# Patient Record
Sex: MALE | URBAN - METROPOLITAN AREA
[De-identification: names, ages, dates, MRNs, and addresses within clinical notes are randomized per-mention and may not be internally consistent; named-entity substitution may affect disease eponyms.]

---

## 2017-02-02 ENCOUNTER — THERAPY VISIT (OUTPATIENT)
Dept: PHYSICAL THERAPY | Facility: CLINIC | Age: 22
End: 2017-02-02
Payer: COMMERCIAL

## 2017-02-02 DIAGNOSIS — M25.519 SHOULDER PAIN: Primary | ICD-10-CM

## 2017-02-02 PROCEDURE — 97112 NEUROMUSCULAR REEDUCATION: CPT | Mod: GP | Performed by: PHYSICAL THERAPIST

## 2017-02-02 PROCEDURE — 97161 PT EVAL LOW COMPLEX 20 MIN: CPT | Mod: GP | Performed by: PHYSICAL THERAPIST

## 2017-02-02 PROCEDURE — 97110 THERAPEUTIC EXERCISES: CPT | Mod: GP | Performed by: PHYSICAL THERAPIST

## 2017-02-02 NOTE — Clinical Note
[facility] [place] [id number] [facility] [id number]    February 3, 2017    Re: Nghia Madrid   :   1995  MRN:  [id number]   REFERRING PHYSICIAN:   Edward Medel    [facility]    Date of Initial Evaluation: 17  Visits:  Rxs Used: 1  Reason for Referral:  Shoulder pain    EVALUATION SUMMARY    [facility] Initial Evaluation  Physical Therapy Initial Examination/Evaluation  2017    Nghia Madrid is a 21 year old  male referred to physical therapy by Dr. Medel for treatment of right shoulder pain with Precautions/Restrictions/MD instructions eval and treat      Subjective:  Referring MD visit date: 17  DOI/onset: 17  Mechanism of injury: Overuse injury from rowing  DOS N/A  Previous treatment: None  Imaging: None  Chief Complaint:   Right shoulder pain that increases with various activities including lifting, lying on right side, and recreational activities such as rowing   Pain: rest 2 /10, activity 4/10 with rowing Described as: ache sometimes sharp Alleviated by: rest, inactivity Frequency: intermittent Progression of symptoms since initial onset: same Time of day when pain is worse: day  Sleeping: Will wake if sleeping on right side  Social history:  Is on rowing team at [employer]    Occupation: Student     Current HEP/exercise regimen: Progressive rotator cuff strengthening, scapular stabilzation  Patient's goals are Decrease right shoulder pain, return to prior level of function without pain    Pertinent PMH: None   General Health Reported by Patient: good  Return to MD:  6 weeks if no improvement         Pain level reported as 2/10. General health as reported by patient is good.        Current medications:  Pain medication.  Current occupation is Student.           SHOULDER EVALUATION  Static Posture:  Forward head: None Rounded shoulders: None Scapular winging: Significant scapular winging  with ext/IR bilaterally    Dynamic scapular assessment: Decreased early upward rotation of right shoulder  Flip Sign: + bilaterally     Range of Motion:    AROM Flexion Abduction Flex/ER Ext/IR   Left 180 180 T2 T3   Right 180 180 T2 T3       Strength:  MMT Flex Scaption Abd ER @ 0 IR @ 0 Mid trap Lower trap   Left 5 5 5 5 5     Right 4+ 4+ 4+ 4 5       Special Tests  Left Right   Empty Can - -   Drop arm  - -   Yergason's  - -   Speed's - +   Polanco-Ildefonso - +   Neer - -   Lift-off - -   Apprehension - -   Montero's - +   Sulcus Sign - -   AC cross body - -                                              Palpation:  Left: unremarkable  Right: unremarkable     HPI                    Objective:    System    Physical Exam    General     ROS    Assessment/Plan:      Patient is a 21 year old male with right side shoulder complaints.    Patient has the following significant findings with corresponding treatment plan.                Diagnosis 1:  Right shoulder pain  Pain -  manual therapy, self management, education and home program  Decreased ROM/flexibility - manual therapy, therapeutic exercise, therapeutic activity and home program  Decreased strength - therapeutic exercise, therapeutic activities and home program  Decreased proprioception - neuro re-education, therapeutic activities and home program  Impaired muscle performance - neuro re-education and home program    Therapy Evaluation Codes:   1) History comprised of:   Personal factors that impact the plan of care:      None.    Comorbidity factors that impact the plan of care are:      None.     Medications impacting care: None.  2) Examination of Body Systems comprised of:   Body structures and functions that impact the plan of care:      Shoulder.   Activity limitations that impact the plan of care are:      Grasping, Lifting and Sports.  3) Clinical presentation characteristics are:   Stable/Uncomplicated.  4) Decision-Making    Low complexity using  standardized patient assessment instrument and/or measureable assessment of functional outcome.  Cumulative Therapy Evaluation is: Low complexity.    Previous and current functional limitations:  (See Goal Flow Sheet for this information)    Short term and Long term goals: (See Goal Flow Sheet for this information)     Communication ability:  Patient appears to be able to clearly communicate and understand verbal and written communication and follow directions correctly.  Treatment Explanation - The following has been discussed with the patient:   RX ordered/plan of care  Anticipated outcomes  Possible risks and side effects  This patient would benefit from PT intervention to resume normal activities.   Rehab potential is good.    Frequency:  1 X week, once daily  Duration:  for 6 weeks  Discharge Plan:  Achieve all LTG.  Independent in home treatment program.  Reach maximal therapeutic benefit.      Thank you for your referral.    INQUIRIES  Therapist: Sergey Ruiz, PT  [facility] [address] [address]  Fax: [phone]

## 2017-02-02 NOTE — PROGRESS NOTES
[facility] Initial Evaluation  Physical Therapy Initial Examination/Evaluation  February 2, 2017    Nghia Madrid is a 21 year old  male referred to physical therapy by Dr. Medel for treatment of right shoulder pain with Precautions/Restrictions/MD instructions eval and treat      Subjective:  Referring MD visit date: 1/20/17  DOI/onset: 1/8/17  Mechanism of injury: Overuse injury from rowing  DOS N/A  Previous treatment: None  Imaging: None  Chief Complaint:   Right shoulder pain that increases with various activities including lifting, lying on right side, and recreational activities such as rowing   Pain: rest 2 /10, activity 4/10 with rowing Described as: ache sometimes sharp Alleviated by: rest, inactivity Frequency: intermittent Progression of symptoms since initial onset: same Time of day when pain is worse: day  Sleeping: Will wake if sleeping on right side  Social history:  Is on rowing team at [employer]    Occupation: Student     Current HEP/exercise regimen: Progressive rotator cuff strengthening, scapular stabilzation  Patient's goals are Decrease right shoulder pain, return to prior level of function without pain    Pertinent PMH: None   General Health Reported by Patient: good  Return to MD:  6 weeks if no improvement      SHOULDER EVALUATION  Static Posture:  Forward head: None Rounded shoulders: None Scapular winging: Significant scapular winging with ext/IR bilaterally    Dynamic scapular assessment: Decreased early upward rotation of right shoulder  Flip Sign: + bilaterally     Range of Motion:    AROM Flexion Abduction Flex/ER Ext/IR   Left 180 180 T2 T3   Right 180 180 T2 T3       Strength:  MMT Flex Scaption Abd ER @ 0 IR @ 0 Mid trap Lower trap   Left 5 5 5 5 5     Right 4+ 4+ 4+ 4 5       Special Tests  Left Right   Empty Can - -   Drop arm  - -   Kike's  - -   Speed's - +   Sherri-Ildefonso - +   Neer - -   Lift-off - -   Apprehension - -   Montero's - +   Sulcus Sign - -    AC cross body - -                                              Palpation:  Left: unremarkable  Right: unremarkable     HPI                    Objective:    System    Physical Exam    General     ROS    Assessment/Plan:      Patient is a 21 year old male with right side shoulder complaints.    Patient has the following significant findings with corresponding treatment plan.                Diagnosis 1:  Right shoulder pain  Pain -  manual therapy, self management, education and home program  Decreased ROM/flexibility - manual therapy, therapeutic exercise, therapeutic activity and home program  Decreased strength - therapeutic exercise, therapeutic activities and home program  Decreased proprioception - neuro re-education, therapeutic activities and home program  Impaired muscle performance - neuro re-education and home program    Therapy Evaluation Codes:   1) History comprised of:   Personal factors that impact the plan of care:      None.    Comorbidity factors that impact the plan of care are:      None.     Medications impacting care: None.  2) Examination of Body Systems comprised of:   Body structures and functions that impact the plan of care:      Shoulder.   Activity limitations that impact the plan of care are:      Grasping, Lifting and Sports.  3) Clinical presentation characteristics are:   Stable/Uncomplicated.  4) Decision-Making    Low complexity using standardized patient assessment instrument and/or measureable assessment of functional outcome.  Cumulative Therapy Evaluation is: Low complexity.    Previous and current functional limitations:  (See Goal Flow Sheet for this information)    Short term and Long term goals: (See Goal Flow Sheet for this information)     Communication ability:  Patient appears to be able to clearly communicate and understand verbal and written communication and follow directions correctly.  Treatment Explanation - The following has been discussed with the patient:   RX  ordered/plan of care  Anticipated outcomes  Possible risks and side effects  This patient would benefit from PT intervention to resume normal activities.   Rehab potential is good.    Frequency:  1 X week, once daily  Duration:  for 6 weeks  Discharge Plan:  Achieve all LTG.  Independent in home treatment program.  Reach maximal therapeutic benefit.    Please refer to the daily flowsheet for treatment today, total treatment time and time spent performing 1:1 timed codes.

## 2017-02-03 NOTE — PROGRESS NOTES
Subjective:                     and reported as 2/10.                General health as reported by patient is good.        Current medications:  Pain medication.  Current occupation is Student.                                  Objective:    System    Physical Exam    General     ROS    Assessment/Plan:

## 2017-02-10 ENCOUNTER — THERAPY VISIT (OUTPATIENT)
Dept: PHYSICAL THERAPY | Facility: CLINIC | Age: 22
End: 2017-02-10
Payer: COMMERCIAL

## 2017-02-10 DIAGNOSIS — M25.519 SHOULDER PAIN: Primary | ICD-10-CM

## 2017-02-10 PROCEDURE — 97110 THERAPEUTIC EXERCISES: CPT | Mod: GP | Performed by: PHYSICAL THERAPIST

## 2017-02-10 PROCEDURE — 97112 NEUROMUSCULAR REEDUCATION: CPT | Mod: GP | Performed by: PHYSICAL THERAPIST

## 2017-02-17 ENCOUNTER — THERAPY VISIT (OUTPATIENT)
Dept: PHYSICAL THERAPY | Facility: CLINIC | Age: 22
End: 2017-02-17
Payer: COMMERCIAL

## 2017-02-17 DIAGNOSIS — M25.519 SHOULDER PAIN: ICD-10-CM

## 2017-02-17 PROCEDURE — 97112 NEUROMUSCULAR REEDUCATION: CPT | Mod: GP | Performed by: PHYSICAL THERAPIST

## 2017-02-17 PROCEDURE — 97110 THERAPEUTIC EXERCISES: CPT | Mod: GP | Performed by: PHYSICAL THERAPIST
